# Patient Record
Sex: MALE | Race: AMERICAN INDIAN OR ALASKA NATIVE
[De-identification: names, ages, dates, MRNs, and addresses within clinical notes are randomized per-mention and may not be internally consistent; named-entity substitution may affect disease eponyms.]

---

## 2018-02-03 ENCOUNTER — HOSPITAL ENCOUNTER (EMERGENCY)
Dept: HOSPITAL 31 - C.ER | Age: 48
Discharge: HOME | End: 2018-02-03
Payer: COMMERCIAL

## 2018-02-03 VITALS — OXYGEN SATURATION: 100 %

## 2018-02-03 VITALS
RESPIRATION RATE: 18 BRPM | DIASTOLIC BLOOD PRESSURE: 89 MMHG | SYSTOLIC BLOOD PRESSURE: 136 MMHG | HEART RATE: 86 BPM | TEMPERATURE: 98 F

## 2018-02-03 DIAGNOSIS — N23: Primary | ICD-10-CM

## 2018-02-03 LAB
ALBUMIN SERPL-MCNC: 4.6 G/DL (ref 3.5–5)
ALBUMIN/GLOB SERPL: 1.3 {RATIO} (ref 1–2.1)
ALT SERPL-CCNC: 38 U/L (ref 21–72)
AST SERPL-CCNC: 31 U/L (ref 17–59)
BASOPHILS # BLD AUTO: 0 K/UL (ref 0–0.2)
BASOPHILS NFR BLD: 0.2 % (ref 0–2)
BASOPHILS NFR BLD: 1 % (ref 0–2)
BILIRUB UR-MCNC: NEGATIVE MG/DL
BUN SERPL-MCNC: 15 MG/DL (ref 9–20)
CALCIUM SERPL-MCNC: 9.7 MG/DL (ref 8.6–10.4)
EOSINOPHIL # BLD AUTO: 0 K/UL (ref 0–0.7)
EOSINOPHIL NFR BLD: 0.5 % (ref 0–4)
ERYTHROCYTE [DISTWIDTH] IN BLOOD BY AUTOMATED COUNT: 13.7 % (ref 11.5–14.5)
GFR NON-AFRICAN AMERICAN: 60
GLUCOSE UR STRIP-MCNC: NORMAL MG/DL
HGB BLD-MCNC: 15.6 G/DL (ref 12–18)
LEUKOCYTE ESTERASE UR-ACNC: (no result) LEU/UL
LG PLATELETS BLD QL SMEAR: PRESENT
LIPASE: 47 U/L (ref 23–300)
LYMPHOCYTE: 7 % (ref 20–40)
LYMPHOCYTES # BLD AUTO: 0.6 K/UL (ref 1–4.3)
LYMPHOCYTES NFR BLD AUTO: 6.7 % (ref 20–40)
MCH RBC QN AUTO: 30.3 PG (ref 27–31)
MCHC RBC AUTO-ENTMCNC: 34.9 G/DL (ref 33–37)
MCV RBC AUTO: 86.8 FL (ref 80–94)
MONOCYTE: 7 % (ref 0–10)
MONOCYTES # BLD: 0.4 K/UL (ref 0–0.8)
MONOCYTES NFR BLD: 5.1 % (ref 0–10)
NEUTROPHILS # BLD: 7.3 K/UL (ref 1.8–7)
NEUTROPHILS NFR BLD AUTO: 84 % (ref 50–75)
NEUTROPHILS NFR BLD AUTO: 87.5 % (ref 50–75)
NEUTS BAND NFR BLD: 1 % (ref 0–2)
NRBC BLD AUTO-RTO: 0.1 % (ref 0–2)
PH UR STRIP: 8 [PH] (ref 5–8)
PLATELET # BLD EST: NORMAL 10*3/UL
PLATELET # BLD: 194 K/UL (ref 130–400)
PMV BLD AUTO: 8.6 FL (ref 7.2–11.7)
PROT UR STRIP-MCNC: NEGATIVE MG/DL
RBC # BLD AUTO: 5.16 MIL/UL (ref 4.4–5.9)
RBC # UR STRIP: NEGATIVE /UL
SP GR UR STRIP: 1.02 (ref 1–1.03)
TOTAL CELLS COUNTED BLD: 100
URINE AMORPHOUS SEDIMENT: (no result) /UL
URINE NITRATE: NEGATIVE
UROBILINOGEN UR-MCNC: NORMAL MG/DL (ref 0.2–1)
WBC # BLD AUTO: 8.4 K/UL (ref 4.8–10.8)

## 2018-02-03 PROCEDURE — 85025 COMPLETE CBC W/AUTO DIFF WBC: CPT

## 2018-02-03 PROCEDURE — 96374 THER/PROPH/DIAG INJ IV PUSH: CPT

## 2018-02-03 PROCEDURE — 80053 COMPREHEN METABOLIC PANEL: CPT

## 2018-02-03 PROCEDURE — 96376 TX/PRO/DX INJ SAME DRUG ADON: CPT

## 2018-02-03 PROCEDURE — 99284 EMERGENCY DEPT VISIT MOD MDM: CPT

## 2018-02-03 PROCEDURE — 96375 TX/PRO/DX INJ NEW DRUG ADDON: CPT

## 2018-02-03 PROCEDURE — 83690 ASSAY OF LIPASE: CPT

## 2018-02-03 PROCEDURE — 81001 URINALYSIS AUTO W/SCOPE: CPT

## 2018-02-03 PROCEDURE — 74177 CT ABD & PELVIS W/CONTRAST: CPT

## 2018-02-03 NOTE — C.PDOC
History Of Present Illness


46 y/o male presents to ED with complaints of right sided abdominal pain 

associated with nausea and no appetite that began 1PM today. Denies vomiting, 

fever, or back pain. Pt states pain is worse when he walks. Pt also states has 

not been taking his blood pressure medication. 


Chief Complaint (Nursing): Abdominal Pain


History Per: Patient


History/Exam Limitations: no limitations


Onset/Duration Of Symptoms: Hrs


Current Symptoms Are (Timing): Still Present


Location Of Pain/Discomfort: RUQ, RLQ


Radiation Of Pain To:: None


Associated Symptoms: Nausea.  denies: Fever, Vomiting


Exacerbating Factors: None


Alleviating Factors: None


Recent travel outside of the [place]: No





Past Medical History


Reviewed: Historical Data, Nursing Documentation, Vital Signs


Vital Signs: 


 Last Vital Signs











Temp  98 F   18 23:46


 


Pulse  86   18 23:46


 


Resp  18   18 23:46


 


BP  136/89   18 23:46


 


Pulse Ox  98   18 23:46














- Medical History


PMH: HTN


Surgical History: No Surg Hx


Family History: States: No Known Family Hx





- Social History


Hx Alcohol Use: No


Hx Substance Use: No





Review Of Systems


Constitutional: Negative for: Fever


Cardiovascular: Negative for: Chest Pain


Gastrointestinal: Positive for: Nausea, Abdominal Pain.  Negative for: Vomiting

, Diarrhea


Musculoskeletal: Negative for: Back Pain


Neurological: Negative for: Weakness, Numbness





Physical Exam





- Physical Exam


Appears: Well, Non-toxic, In Acute Distress (Mild due to pain)


Skin: Normal Color, Warm, Dry


Head: Atraumatic, Normacephalic


Eye(s): bilateral: Normal Inspection


Oral Mucosa: Moist


Neck: Supple


Chest: Symmetrical, No Tenderness


Cardiovascular: Rhythm Regular


Respiratory: Normal Breath Sounds, No Rales, No Rhonchi, No Wheezing


Gastrointestinal/Abdominal: Bowel Sounds, Soft, Tenderness (Subjective in RLQ), 

No Guarding, No Rebound, Other (patient grimaces when examined by stethoscope 

in RUQ and RLQ; more in the RLQ. Positive obturator and psoas sign)


Neurological/Psych: Oriented x3, Normal Speech, Normal Cognition





ED Course And Treatment





- Laboratory Results


Result Diagrams: 


 18 20:48





 18 20:48


O2 Sat by Pulse Oximetry: 100 (RA)


Pulse Ox Interpretation: Normal





- CT Scan/US


  ** CT Abdomen & Pelvis


Other Rad Studies (CT/US): Read By Radiologist, Radiology Report Reviewed


CT/US Interpretation: EXAM:  CT Abdomen and Pelvis With Intravenous Contrast.  

CLINICAL HISTORY:  47 years old, male; Pain; Abdominal pain; Localized; Right; 

Additional info: Abd pain.  TECHNIQUE:  Axial computed tomography images of the 

abdomen and pelvis with intravenous contrast. All CT.  scans at this facility 

use one or more dose reduction techniques, viz.: automated exposure control;.  

ma/kV adjustment per patient size (including targeted exams where dose is 

matched to indication; i.e.  head); or iterative reconstruction technique.  

Coronal and sagittal reformatted images were created and reviewed.  CONTRAST:  

100 mL of [id number] administered intravenously.  COMPARISON:  No relevant prior 

studies available.  FINDINGS:  Lower thorax: No acute findings.  ABDOMEN:  Liver

: Small calcification.  Gallbladder and bile ducts: No calcified stones. No 

ductal dilation.  Pancreas: No ductal dilation. No mass.  Spleen: No 

splenomegaly.  Adrenals: No mass.  [facility].  [employer].  

Final Radiology Report [id number].  Name: SUREKHA ACUÑA Age: 47Years M Date: 2018.  MRN: [id number] SSN:  : 1970.  Study: CT ABDOMEN/

PELVIS W Requesting Physician: Rain Palomo.  Accession: [id number] 

Images: 585.  Addl Studies:  Provided Clinical History: abd pain.  

CONFIDENTIALITY STATEMENT.  This transmission is confidential and is intended 

to be a privileged communication. It is intended only for the use of the 

addressee. Access to this.  message by anyone else is unauthorized. If you are 

not the intended recipient, any disclosure, copying, distribution or any action 

taken, or omitted to.  be taken in reliance on it is prohibited and may be 

unlawful. If you received this communication in error, please notify us by 

telephone, so that return.  of this document to us can be arranged.  Page 2 of 

2.  Kidneys and ureters: Moderate stranding/fluid about RIGHT kidney. Delayed.  

enhancement/excretion of RIGHT kidney. Too small to characterize lesion within 

RIGHT kidney. No.  renal calculi. Mild-to-moderate pelvocaliectasis of RIGHT 

kidney. Mild to moderately dilated RIGHT.  ureter. 0.2 x 0.2 x 0.2 cm calculus 

at/near RIGHT ureterovesical junction.  Stomach and bowel: Segmental areas of 

probable underdistention of colon. No definite mural.  thickening. No 

obstruction.  Appendix: No findings to suggest acute appendicitis.  PELVIS:  

Bladder: Unremarkable.  Reproductive: Unremarkable as visualized.  ABDOMEN and 

PELVIS:  Intraperitoneal space: Small amount of fluid within RIGHT paracolic 

gutter. No free air.  Bones/joints: No acute fracture.  Soft tissues: 

Unremarkable.  Vasculature: Minimal atherosclerotic disease of iliac arteries. 

No aneurysm.  Lymph nodes: No pathologically enlarged lymph nodes.  IMPRESSION:

  1. RIGHT distal ureteral calculus with mild-to-moderate 

hydroureteronephrosis.  2. Incidental/non-acute findings are described above.  

Thank you for allowing us to participate in the care of your patient.  Dictated 

and Authenticated by: Rios Donovan MD.  2018 10:10 PM Eastern Time ([place] 

& [place])





Medical Decision Making


Medical Decision Making: 





Administered IV fluids. Ordered blood work and CT of abdomen and pelvis. 





Disposition





- Disposition


Referrals: 


Maikol Tilley MD [Staff Provider] - 


Disposition: HOSPITALIZED


Disposition Time: 23:00


Condition: FAIR


Prescriptions: 


Ciprofloxacin HCl [Cipro] 500 mg PO BID #10 tablet


oxyCODONE/Acetaminophen [Percocet 5/325 mg Tab] 1 ea PO QID PRN #12 tab


 PRN Reason: Pain, Mild (1-3)


Tamsulosin [Flomax] 0.4 mg PO DAILY 5 Days #5 cap


Instructions:  Renal Colic (ED)


Forms:  [facility] (English)


Print Language: ENGLISH





- Clinical Impression


Clinical Impression: 


 Renal colic on right side








- Scribe Statement


The provider has reviewed the documentation as recorded by the Micha Batista





All medical record entries made by the Anhiblibertad were at my direction and 

personally dictated by me. I have reviewed the chart and agree that the record 

accurately reflects my personal performance of the history, physical exam, 

medical decision making, and the department course for this patient. I have 

also personally directed, reviewed, and agree with the discharge instructions 

and disposition.

## 2018-02-03 NOTE — CT
EXAM:

  CT Abdomen and Pelvis With Intravenous Contrast



CLINICAL HISTORY:

  47 years old, male; Pain; Abdominal pain; Localized; Right; Additional info: 

Abd pain



TECHNIQUE:

  Axial computed tomography images of the abdomen and pelvis with intravenous 

contrast.  All CT scans at this facility use one or more dose reduction 

techniques, viz.: automated exposure control; ma/kV adjustment per patient size 

(including targeted exams where dose is matched to indication; i.e. head); or 

iterative reconstruction technique.

  Coronal and sagittal reformatted images were created and reviewed.



CONTRAST:

  100 mL of [id number] administered intravenously.



COMPARISON:

  No relevant prior studies available.



FINDINGS:

  Lower thorax: No acute findings.



 ABDOMEN:

  Liver:  Small calcification.

  Gallbladder and bile ducts:  No calcified stones.  No ductal dilation.

  Pancreas:  No ductal dilation.  No mass.

  Spleen:  No splenomegaly.

  Adrenals:  No mass.

  Kidneys and ureters:  Moderate stranding/fluid about RIGHT kidney.  Delayed 

enhancement/excretion of RIGHT kidney.  Too small to characterize lesion within 

RIGHT kidney.  No renal calculi.  Mild-to-moderate pelvocaliectasis of RIGHT 

kidney.  Mild to moderately dilated RIGHT ureter.  0.2 x 0.2 x 0.2 cm calculus 

at/near RIGHT ureterovesical junction.

  Stomach and bowel:  Segmental areas of probable underdistention of colon.  No 

definite mural thickening.  No obstruction.

  Appendix:  No findings to suggest acute appendicitis.



 PELVIS:

  Bladder:  Unremarkable.

  Reproductive:  Unremarkable as visualized.



 ABDOMEN and PELVIS:

  Intraperitoneal space:  Small amount of fluid within RIGHT paracolic gutter.  

No free air.

  Bones/joints:  No acute fracture.

  Soft tissues:  Unremarkable.

  Vasculature:  Minimal atherosclerotic disease of iliac arteries.  No aneurysm.

  Lymph nodes:  No pathologically enlarged lymph nodes.



IMPRESSION:     

1.  RIGHT distal ureteral calculus with mild-to-moderate hydroureteronephrosis.

2.  Incidental/non-acute findings are described above.